# Patient Record
Sex: FEMALE | Race: WHITE | Employment: FULL TIME | URBAN - METROPOLITAN AREA
[De-identification: names, ages, dates, MRNs, and addresses within clinical notes are randomized per-mention and may not be internally consistent; named-entity substitution may affect disease eponyms.]

---

## 2022-06-08 ENCOUNTER — OFFICE VISIT (OUTPATIENT)
Dept: FAMILY MEDICINE CLINIC | Age: 37
End: 2022-06-08
Payer: MEDICARE

## 2022-06-08 VITALS
DIASTOLIC BLOOD PRESSURE: 68 MMHG | OXYGEN SATURATION: 98 % | HEIGHT: 65 IN | WEIGHT: 131.8 LBS | SYSTOLIC BLOOD PRESSURE: 98 MMHG | HEART RATE: 80 BPM | BODY MASS INDEX: 21.96 KG/M2 | TEMPERATURE: 98.1 F

## 2022-06-08 DIAGNOSIS — G43.D1 INTRACTABLE ABDOMINAL MIGRAINE: ICD-10-CM

## 2022-06-08 DIAGNOSIS — Z13.220 ENCOUNTER FOR LIPID SCREENING FOR CARDIOVASCULAR DISEASE: ICD-10-CM

## 2022-06-08 DIAGNOSIS — F12.90 MARIJUANA SMOKER: ICD-10-CM

## 2022-06-08 DIAGNOSIS — Z11.59 ENCOUNTER FOR SCREENING FOR OTHER VIRAL DISEASES: ICD-10-CM

## 2022-06-08 DIAGNOSIS — Z00.01 ENCOUNTER FOR WELL ADULT EXAM WITH ABNORMAL FINDINGS: Primary | ICD-10-CM

## 2022-06-08 DIAGNOSIS — F43.22 ADJUSTMENT DISORDER WITH ANXIETY: ICD-10-CM

## 2022-06-08 DIAGNOSIS — Z13.6 ENCOUNTER FOR LIPID SCREENING FOR CARDIOVASCULAR DISEASE: ICD-10-CM

## 2022-06-08 PROCEDURE — 99385 PREV VISIT NEW AGE 18-39: CPT | Performed by: FAMILY MEDICINE

## 2022-06-08 RX ORDER — SUMATRIPTAN 20 MG/1
1 SPRAY NASAL PRN
Qty: 1 EACH | Refills: 3 | Status: SHIPPED | OUTPATIENT
Start: 2022-06-08 | End: 2022-09-21

## 2022-06-08 RX ORDER — RIZATRIPTAN BENZOATE 10 MG/1
10 TABLET ORAL
Qty: 27 TABLET | Refills: 1 | Status: SHIPPED | OUTPATIENT
Start: 2022-06-08 | End: 2022-06-08 | Stop reason: ALTCHOICE

## 2022-06-08 RX ORDER — PROCHLORPERAZINE MALEATE 5 MG/1
5 TABLET ORAL EVERY 8 HOURS PRN
Qty: 30 TABLET | Refills: 0 | Status: SHIPPED | OUTPATIENT
Start: 2022-06-08 | End: 2022-09-21 | Stop reason: SDUPTHER

## 2022-06-08 SDOH — ECONOMIC STABILITY: FOOD INSECURITY: WITHIN THE PAST 12 MONTHS, YOU WORRIED THAT YOUR FOOD WOULD RUN OUT BEFORE YOU GOT MONEY TO BUY MORE.: NEVER TRUE

## 2022-06-08 SDOH — HEALTH STABILITY: PHYSICAL HEALTH: ON AVERAGE, HOW MANY MINUTES DO YOU ENGAGE IN EXERCISE AT THIS LEVEL?: 40 MIN

## 2022-06-08 SDOH — HEALTH STABILITY: PHYSICAL HEALTH: ON AVERAGE, HOW MANY DAYS PER WEEK DO YOU ENGAGE IN MODERATE TO STRENUOUS EXERCISE (LIKE A BRISK WALK)?: 3 DAYS

## 2022-06-08 SDOH — ECONOMIC STABILITY: FOOD INSECURITY: WITHIN THE PAST 12 MONTHS, THE FOOD YOU BOUGHT JUST DIDN'T LAST AND YOU DIDN'T HAVE MONEY TO GET MORE.: NEVER TRUE

## 2022-06-08 ASSESSMENT — ANXIETY QUESTIONNAIRES
3. WORRYING TOO MUCH ABOUT DIFFERENT THINGS: 0
5. BEING SO RESTLESS THAT IT IS HARD TO SIT STILL: 0
6. BECOMING EASILY ANNOYED OR IRRITABLE: 1
7. FEELING AFRAID AS IF SOMETHING AWFUL MIGHT HAPPEN: 0
IF YOU CHECKED OFF ANY PROBLEMS ON THIS QUESTIONNAIRE, HOW DIFFICULT HAVE THESE PROBLEMS MADE IT FOR YOU TO DO YOUR WORK, TAKE CARE OF THINGS AT HOME, OR GET ALONG WITH OTHER PEOPLE: NOT DIFFICULT AT ALL
GAD7 TOTAL SCORE: 4
4. TROUBLE RELAXING: 1
1. FEELING NERVOUS, ANXIOUS, OR ON EDGE: 1
2. NOT BEING ABLE TO STOP OR CONTROL WORRYING: 1

## 2022-06-08 ASSESSMENT — SOCIAL DETERMINANTS OF HEALTH (SDOH)
WITHIN THE LAST YEAR, HAVE YOU BEEN KICKED, HIT, SLAPPED, OR OTHERWISE PHYSICALLY HURT BY YOUR PARTNER OR EX-PARTNER?: NO
HOW HARD IS IT FOR YOU TO PAY FOR THE VERY BASICS LIKE FOOD, HOUSING, MEDICAL CARE, AND HEATING?: NOT VERY HARD
WITHIN THE LAST YEAR, HAVE TO BEEN RAPED OR FORCED TO HAVE ANY KIND OF SEXUAL ACTIVITY BY YOUR PARTNER OR EX-PARTNER?: NO
WITHIN THE LAST YEAR, HAVE YOU BEEN HUMILIATED OR EMOTIONALLY ABUSED IN OTHER WAYS BY YOUR PARTNER OR EX-PARTNER?: NO
WITHIN THE LAST YEAR, HAVE YOU BEEN AFRAID OF YOUR PARTNER OR EX-PARTNER?: NO

## 2022-06-08 ASSESSMENT — ENCOUNTER SYMPTOMS
NAUSEA: 1
SORE THROAT: 0
EYE REDNESS: 0
ABDOMINAL PAIN: 1
SINUS PRESSURE: 0
COUGH: 0
TROUBLE SWALLOWING: 0
BLOOD IN STOOL: 0
VOMITING: 0
CONSTIPATION: 1
CHEST TIGHTNESS: 0
BACK PAIN: 0
RHINORRHEA: 0
ABDOMINAL DISTENTION: 0
SHORTNESS OF BREATH: 0
STRIDOR: 0
COLOR CHANGE: 0
RECTAL PAIN: 0
WHEEZING: 0
DIARRHEA: 0

## 2022-06-08 ASSESSMENT — PATIENT HEALTH QUESTIONNAIRE - PHQ9
2. FEELING DOWN, DEPRESSED OR HOPELESS: 0
SUM OF ALL RESPONSES TO PHQ9 QUESTIONS 1 & 2: 1
SUM OF ALL RESPONSES TO PHQ QUESTIONS 1-9: 1
1. LITTLE INTEREST OR PLEASURE IN DOING THINGS: 1
SUM OF ALL RESPONSES TO PHQ QUESTIONS 1-9: 1

## 2022-06-08 NOTE — PROGRESS NOTES
Visit Information    Have you changed or started any medications since your last visit including any over-the-counter medicines, vitamins, or herbal medicines? no   Have you stopped taking any of your medications? Is so, why? -  no  Are you having any side effects from any of your medications? - no    Have you seen any other physician or provider since your last visit?  no   Have you had any other diagnostic tests since your last visit?  no   Have you been seen in the emergency room and/or had an admission in a hospital since we last saw you?  no   Have you had your routine dental cleaning in the past 6 months?  yes -     Do you have an active MyChart account? If no, what is the barrier?   Yes    Patient Care Team:  Henrry Argueta MD as PCP - General (Family Medicine)    Medical History Review  Past Medical, Family, and Social History reviewed and does contribute to the patient presenting condition    Health Maintenance   Topic Date Due    Varicella vaccine (1 of 2 - 2-dose childhood series) Never done    COVID-19 Vaccine (1) Never done    Depression Screen  Never done    HIV screen  Never done    Hepatitis C screen  Never done    Cervical cancer screen  Never done    Flu vaccine (Season Ended) 09/01/2022    DTaP/Tdap/Td vaccine (2 - Td or Tdap) 12/12/2027    Hepatitis A vaccine  Aged Out    Hepatitis B vaccine  Aged Out    Hib vaccine  Aged Out    Meningococcal (ACWY) vaccine  Aged Out    Pneumococcal 0-64 years Vaccine  Aged Out

## 2022-06-08 NOTE — PATIENT INSTRUCTIONS
New Updates for [facility] MyChart/ [employer] ([facility]) [facility]    Thank you for choosing [facility] to give you the best care! [employer] ([facility]) is always trying to think of new ways to help their patients. We are asking all patients to try out the new digital registration that is now available through your [facility] account or the new [facility], [employer] ([facility]). Via the nghia you're now able to update your personal and registration information prior to your upcoming appointment. This will save you time once you arrive at the office to check-in, not to mention your information remains safe!! Many other perks come from signing up for an account, such as:   Requesting refills   Scheduling an appointment   Completing an [address] Sending a message to the office/provider   Having access to your medication list   Paying your bill/copay prior to your appointment   Scheduling your yearly mammogram   Review your test results    If you are not familiar with [facility] or the [employer] ([facility]) [facility]IA, please ask one of us and we will be happy to answer any questions or help you set-up your account. Your [facility],  [facility]    Patient Education        Learning About Cannabis Use Disorder  What is cannabis use disorder? Cannabis is a drug that comes from the cannabis plant. Different forms of cannabis include marijuana, hashish, and hash oil. Some people who use cannabis develop cannabis use disorder. Cannabis use disorder means that a person uses cannabis even though it causes harm to themselves or others. The disorder can range from mild to severe. The more signs of this disorder you have, the more severe it may be. When it's severe, it's sometimes called addiction. People whohave it may find it hard to control their use of cannabis. What are the signs? You may have cannabis use disorder if two or more of the following are true. The more signs of this disorder you have, the more severe it may be.    You use larger amounts of cannabis than you ever meant to. Or you've been using it for a longer period of time than you ever meant to.  You can't cut down or control your use. Or you constantly wish you could cut down.  You spend a lot of time getting or using cannabis or recovering from the effects.  You have strong cravings for cannabis.  You can no longer do your main jobs at work, school, or home.  You keep using even though your cannabis use is hurting your relationships.  You have stopped doing important activities because of your cannabis use.  You use cannabis in situations where doing so is dangerous.  You keep using cannabis even though you know it's causing physical or psychological health problems.  You need more and more cannabis to get the same effect, or you get less effect from the same amount over time. This is called tolerance.  You have uncomfortable symptoms when you stop using cannabis or use less. This is called withdrawal.  How is cannabis use disorder treated? Treatment may include group therapy, one or more types of counseling, and drugeducation. Sometimes medicines are used to help manage symptoms. Treatment focuses on more than drug use. It helps you cope with the anger, frustration, sadness, and disappointment that often happen when a person triesto stop using drugs. Treatment also looks at other parts of your life. For example, how are your relationships with friends and family? What's going on at school and work? Do you have health problems? What is your living situation? Treatment helps you find and manage problems. It helps you take control of your life so you don'thave to depend on drugs. A drug problem affects the whole family. Family counseling often is part oftreatment. Follow-up care is a key part of your treatment and safety. Be sure to make and go to all appointments, and call your doctor if you are having problems.  It's also a good idea to know your test results and keep alist of the medicines you take. Where can you learn more? Go to [email or web address] and sign in to your [employer] account. Enter B669 in the [facility] box to learn more about \"Learning About Cannabis Use Disorder. \"     If you do not have an account, please click on the \"Sign Up Now\" link. Current as of: November 8, 2021               Content Version: 13.2  © 2006-2022 Healthwise, Incorporated. Care instructions adapted under license by [facility] ([facility]). If you have questions about a medical condition or this instruction, always ask your healthcare professional. [address] any warranty or liability for your use of this information.

## 2022-06-08 NOTE — PROGRESS NOTES
2022      Boston Marshall (:  1985) is a 43 y.o. female, here for a preventive medicine evaluation, New Patient, Annual Exam, and Migraine (been going on for at least 10 yrs, at least once a month, feels like it happens around her menstruation)   . ASSESSMENT/PLAN:    1. Encounter for well adult exam with abnormal findings  Physical done  -     CBC with Auto Differential; Future  -     Comprehensive Metabolic Panel; Future  2. Intractable abdominal migraine  Start on Imitrex nasal to help with migraine headaches discussed about the prophylactic dose we will hold on that. Look for chlorpromazine as needed. -     Magnesium; Future  -     Vitamin D 25 Hydroxy; Future  -     Urinalysis with Reflex to Culture; Future  -     prochlorperazine (COMPAZINE) 5 MG tablet; Take 1 tablet by mouth every 8 hours as needed for Nausea, Disp-30 tablet, R-0Normal  -     SUMAtriptan (IMITREX) 20 MG/ACT nasal spray; 1 spray by Nasal route as needed for Migraine, Disp-1 each, R-3Normal  3. Adjustment disorder with anxiety  4. Encounter for screening for other viral diseases  Discussed with patient mother laxation techniques, discussed about the marijuana side effects can try and anxiety medications after weaning from the marijuana  -     Hepatitis C Antibody; Future  -     Varicella Zoster Antibody, IgG; Future  -     HIV Screen; Future  5. Encounter for lipid screening for cardiovascular disease  -     Lipid Panel; Future  -     TSH; Future  6. Marijuana smoker  Counseling and education provided handout also provided. Low carb, low fat diet, increase fruits and vegetables, and exercise 4-5 times a week 30-40 minutes a day, or walk 1-2 hours per day, or wear a pedometer and get at least 10,000 steps per day. Dental exam 2-3 times /year advised. Immunizations reviewed. Health Maintenance reviewed   Smoking cessation counseling given, not smoking    Annual eye exam advised.       Ahsan Peralta received counseling on the following healthy behaviors: nutrition, exercise and medication adherence  Reviewed prior labs and health maintenance  Discussed use, benefit, and side effects of prescribed medications. Barriers to medication compliance addressed. Patient given educational materials - see patient instructions  All patient questions answered. Patient voiced understanding. The patient's past medical, surgical, social, and family history as well as her  current medications and allergies were reviewed as documented in today's encounter. Medications, labs, diagnostic studies, consultations and follow-up as documented in thisencounter. Return in about 3 months (around 2022) for lab work. Data Unavailable    Future Appointments   Date Time Provider Mary Anne Milton   2022 10:45 AM [facility]           Patient Active Problem List   Diagnosis    Intractable abdominal migraine    Marijuana smoker    Adjustment disorder with anxiety       Prior to Visit Medications    Medication Sig Taking? Authorizing Provider   prochlorperazine (COMPAZINE) 5 MG tablet Take 1 tablet by mouth every 8 hours as needed for Nausea Yes Jaron Delacruz MD   SUMAtriptan (IMITREX) 20 MG/ACT nasal spray 1 spray by Nasal route as needed for Migraine Yes Jaron Delacruz MD        No Known Allergies    History reviewed. No pertinent past medical history. Past Surgical History:   Procedure Laterality Date    TONSILLECTOMY         .   Social History     Tobacco Use    Smoking status: Former Smoker     Packs/day: 0.50     Years: 8.00     Pack years: 4.00     Types: Cigarettes     Quit date: 2009     Years since quittin.4    Smokeless tobacco: Never Used   Vaping Use    Vaping Use: Never used   Substance Use Topics    Alcohol use: Not Currently    Drug use: Yes     Types: Marijuana Fronmehul Alva)     Comment: daily       Family History   Problem Relation Age of Onset    Arthritis Mother     Alcohol Abuse Father     Colon Cancer Paternal Grandfather        ADVANCE DIRECTIVE: N, <no information>    ANNIE / Bud Valente is here for New Patient, Annual Exam, and Migraine (been going on for at least 10 yrs, at least once a month, feels like it happens around her menstruation)     Patient is scheduled to establish, patient has not seen any physician since last 5 years. Patient reports she has history of migraine headaches which are chronic was on Imitrex in the past.  Patient reports her migraine headaches are getting worse usually comes 1 and a month but states for the whole day associated with nausea blurring of vision. The pain is about 6-7 on scale radiating to whole head. She denies any numbness, dizziness. Patient has history of anxiety, reports she was started on medication but she could not tolerate that. She has severe side effects does not member the name of the medication. Patient reports she does marijuana which helps with her anxiety and calm her down. Patient is willing to cut down never tried in the past.    BEV 7 SCORE 6/8/2022   BEV-7 Total Score 4     Interpretation of BEV-7 score: 5-9 = mild anxiety, 10-14 = moderate anxiety, 15+ = severe anxiety. Recommend referral to behavioral health for scores 10 or greater. Urinary symptoms: no    Sexually active: Yes     last pap: was normal  The patient has no history of abnormal PAP    Last mammogram: Not due  The patient has no family history of breast cancer    Wears seatbelts: yes     Regular exercise: yes  Ever been transfused or tattooed?: not asked  The patient reports that domestic violence in her life is absent. Last eye exam: up to date: Yes        Visual Acuity Screening    Right eye Left eye Both eyes   Without correction: 20/20/ 20/25 20/20   With correction:          Hearing:normal :Yes    Last dental exam and preventative dental cleaning: up to date : Yes    Nutritional assessment: Body mass index is 21.93 kg/m². Normal.     Weight is   Wt Readings from Last 3 Encounters:   06/08/22 131 lb 12.8 oz (59.8 kg)       Healthful diet and Physical activity counseling to prevent CVD- low carb, low fat diet, increase fruits and vegetables, and exercise 4-5 times a day 30-40minutes a day discussed    Nicotine dependence. no  Smoker, counseling given to quit smoking. Counseling given: Not Answered      Alcohol use: no      There is no immunization history on file for this patient. COVID-19 vaccine:  No:      Tdap one time, then Td every 10 years-up to date:  Yes    Influenza annually-up to date:  Yes    PPSV 23 in all adults 19-63 yo with chronic conditions,smokers, alcoholism,  nursing home residents; then PCV 13 at 73 yo-up to date: No:  or N/A    Menopause: No:    Patient's last menstrual period was 06/02/2022 (exact date). Colon cancer screening recommended at 39years old no   The patient has yes, and his grandpa and age of 76s family history of colon cancer    Blood pressure is normal.  BP Readings from Last 3 Encounters:   06/08/22 98/68       Pulse is normal.  Pulse Readings from Last 3 Encounters:   06/08/22 80       A1c is   No results found for: LABA1C    Lipid screening -    No results found for: CHOL  No results found for: TRIG  No results found for: HDL  No results found for: LDLCHOLESTEROL, LDLCALC  No results found for: [facility]    Cardiovascular risk is: The ASCVD Risk score (Yarelis Mitchell, et al., 2013) failed to calculate for the following reasons: The 2013 ASCVD risk score is only valid for ages 36 to 78    Hepatitis C screening-   No results found for: HAV, HEPAIGM, HEPBIGM, HEPBCAB, HBEAG, HEPCAB         []Negative depression screening.    []1-4 = Minimal depression   []5-9 = Mild depression   []10-14 = Moderate depression   []15-19 = Moderately severe depression   []20-27 = Severe depression    PHQ Scores 6/8/2022   PHQ2 Score 1   PHQ9 Score 1       Health Maintenance   Topic Date Due    Varicella vaccine (1 of 2 - 2-dose childhood series) Never done    COVID-19 Vaccine (1) Never done    HIV screen  Never done    Hepatitis C screen  Never done    Cervical cancer screen  Never done    Flu vaccine (Season Ended) 09/01/2022    Depression Screen  06/08/2023    DTaP/Tdap/Td vaccine (2 - Td or Tdap) 12/12/2027    Hepatitis A vaccine  Aged Out    Hepatitis B vaccine  Aged Out    Hib vaccine  Aged Out    Meningococcal (ACWY) vaccine  Aged Out    Pneumococcal 0-64 years Vaccine  Aged Out       -rest of complaints with corresponding details per ROS    Review of Systems   Constitutional: Negative for activity change, appetite change, fatigue, fever and unexpected weight change. HENT: Negative for congestion, ear pain, postnasal drip, rhinorrhea, sinus pressure, sore throat and trouble swallowing. Eyes: Negative for redness and visual disturbance. Respiratory: Negative for cough, chest tightness, shortness of breath, wheezing and stridor. Cardiovascular: Negative for chest pain, palpitations and leg swelling. Gastrointestinal: Positive for abdominal pain, constipation and nausea. Negative for abdominal distention, blood in stool, diarrhea, rectal pain and vomiting. Endocrine: Negative for polyuria. Genitourinary: Negative for difficulty urinating, flank pain, frequency and urgency. Musculoskeletal: Negative for arthralgias, back pain, gait problem, myalgias and neck pain. Skin: Negative for color change, rash and wound. Allergic/Immunologic: Negative for food allergies and immunocompromised state. Neurological: Positive for headaches. Negative for dizziness, speech difficulty, weakness, light-headedness and numbness. Psychiatric/Behavioral: Positive for decreased concentration. Negative for agitation, behavioral problems, dysphoric mood, hallucinations, sleep disturbance and suicidal ideas.  The patient is nervous/anxious and is hyperactive.          -vital signs stable and within normal limits except     BP 98/68   Pulse 80   Temp 98.1 °F (36.7 °C)   Ht 5' 5\" (1.651 m)   Wt 131 lb 12.8 oz (59.8 kg)   LMP 06/02/2022 (Exact Date)   SpO2 98%   Breastfeeding No   BMI 21.93 kg/m²   Vitals:    06/08/22 0908   BP: 98/68   Pulse: 80   Temp: 98.1 °F (36.7 °C)   SpO2: 98%   Weight: 131 lb 12.8 oz (59.8 kg)   Height: 5' 5\" (1.651 m)     Estimated body mass index is 21.93 kg/m² as calculated from the following:    Height as of this encounter: 5' 5\" (1.651 m). Weight as of this encounter: 131 lb 12.8 oz (59.8 kg). Physical Exam  Vitals and nursing note reviewed. Constitutional:       General: She is not in acute distress. Appearance: Normal appearance. She is well-developed. She is not diaphoretic. HENT:      Head: Normocephalic and atraumatic. Right Ear: Tympanic membrane and ear canal normal.      Left Ear: Tympanic membrane and ear canal normal.      Nose: Nose normal.   Eyes:      General:         Right eye: No discharge. Left eye: No discharge. Extraocular Movements: Extraocular movements intact. Conjunctiva/sclera: Conjunctivae normal.      Pupils: Pupils are equal, round, and reactive to light. Neck:      Thyroid: No thyromegaly. Vascular: No carotid bruit. Cardiovascular:      Rate and Rhythm: Normal rate and regular rhythm. Pulses: No decreased pulses. Heart sounds: Normal heart sounds. No murmur heard. Pulmonary:      Effort: Pulmonary effort is normal. No respiratory distress. Breath sounds: Normal breath sounds. No wheezing or rhonchi. Abdominal:      General: Bowel sounds are normal. There is no distension. Palpations: Abdomen is soft. There is no mass. Tenderness: There is no abdominal tenderness. There is no right CVA tenderness, left CVA tenderness, guarding or rebound. Musculoskeletal:         General: No tenderness. Cervical back: Normal range of motion and neck supple.  No rigidity, spasms or tenderness. Normal range of motion. Thoracic back: No spasms. Normal range of motion. Lumbar back: No spasms. Normal range of motion. Lymphadenopathy:      Cervical: No cervical adenopathy. Right cervical: No superficial cervical adenopathy. Left cervical: No superficial cervical adenopathy. Skin:     Coloration: Skin is not jaundiced or pale. Findings: No bruising, erythema or rash. Neurological:      General: No focal deficit present. Mental Status: She is alert and oriented to person, place, and time. Cranial Nerves: No cranial nerve deficit. Sensory: No sensory deficit. Motor: No weakness or tremor. Coordination: Coordination normal.      Gait: Gait and tandem walk normal.      Deep Tendon Reflexes: Reflexes are normal and symmetric. Psychiatric:         Attention and Perception: Attention and perception normal. She is attentive. Mood and Affect: Mood is anxious. Mood is not depressed. Affect is not tearful. Speech: She is communicative. Speech is rapid and pressured. Speech is not delayed or slurred. Behavior: Behavior normal. Behavior is not agitated or slowed. Thought Content: Thought content normal.         Judgment: Judgment normal.         No flowsheet data found. I personally reviewed testing with patient.       Otherwise labs within normal limits      No results found for: WBC, HGB, HCT, MCV, PLT    No results found for: NA, K, CL, CO2, BUN, CREATININE, GLUCOSE, CALCIUM     No results found for: ALT, AST, GGT, ALKPHOS, BILITOT    No results found for: TSHFT4, TSH    No results found for: LDLCALC, LDLCHOLESTEROL, LDLDIRECT    No results found for: LABA1C    No results found for: [id number]    No results found for: FOLATE    No results found for: IRON, TIBC, FERRITIN    No results found for: VITD25      Orders Placed This Encounter   Medications    DISCONTD: rizatriptan (MAXALT) 10 MG tablet     Sig: Take 1 tablet by mouth once as needed for Migraine May repeat in 2 hours if needed     Dispense:  27 tablet     Refill:  1    prochlorperazine (COMPAZINE) 5 MG tablet     Sig: Take 1 tablet by mouth every 8 hours as needed for Nausea     Dispense:  30 tablet     Refill:  0    SUMAtriptan (IMITREX) 20 MG/ACT nasal spray     Si spray by Nasal route as needed for Migraine     Dispense:  1 each     Refill:  3         Medications Discontinued During This Encounter   Medication Reason    rizatriptan (MAXALT) 10 MG tablet Alternate therapy         Orders Placed This Encounter   Procedures    CBC with Auto Differential     Standing Status:   Future     Standing Expiration Date:   2023    Comprehensive Metabolic Panel     Fasting 8 hrs     Standing Status:   Future     Standing Expiration Date:   2023    Lipid Panel     Standing Status:   Future     Standing Expiration Date:   2023     Order Specific Question:   Is Patient Fasting?/# of Hours     Answer:   yes, 8-10 hours    Hepatitis C Antibody     Standing Status:   Future     Standing Expiration Date:   2023    Magnesium     Standing Status:   Future     Standing Expiration Date:   2023    Vitamin D 25 Hydroxy     Standing Status:   Future     Standing Expiration Date:   2023    Urinalysis with Reflex to Culture     Standing Status:   Future     Standing Expiration Date:   2023     Order Specific Question:   SPECIFY(EX-CATH,MIDSTREAM,CYSTO,ETC)? Answer:   midstream    TSH     Standing Status:   Future     Standing Expiration Date:   2023    Varicella Zoster Antibody, IgG     Standing Status:   Future     Standing Expiration Date:   2023    HIV Screen     Standing Status:   Future     Standing Expiration Date:   2023         This note was completed by using the assistance of a speech-recognition program. However, inadvertent computerized transcription errors may be present.  Although every effort was made to ensure accuracy, no guarantees can be provided that every mistake has been identified and corrected by editing. An electronic signature was used to authenticate this note.     Electronically signed by Jaron Delacruz MD on 6/8/2022 at 10:19 AM

## 2022-09-20 DIAGNOSIS — R30.0 DYSURIA: Primary | ICD-10-CM

## 2022-09-20 PROBLEM — A60.00 HERPES GENITALIS: Status: ACTIVE | Noted: 2017-05-16

## 2022-09-20 RX ORDER — BUTALBITAL, ACETAMINOPHEN AND CAFFEINE 50; 325; 40 MG/1; MG/1; MG/1
1 TABLET ORAL EVERY 4 HOURS PRN
COMMUNITY
Start: 2022-09-16

## 2022-09-20 ASSESSMENT — PATIENT HEALTH QUESTIONNAIRE - PHQ9
SUM OF ALL RESPONSES TO PHQ QUESTIONS 1-9: 0
1. LITTLE INTEREST OR PLEASURE IN DOING THINGS: 0
SUM OF ALL RESPONSES TO PHQ QUESTIONS 1-9: 0
SUM OF ALL RESPONSES TO PHQ QUESTIONS 1-9: 0
2. FEELING DOWN, DEPRESSED OR HOPELESS: 0
SUM OF ALL RESPONSES TO PHQ QUESTIONS 1-9: 0
SUM OF ALL RESPONSES TO PHQ9 QUESTIONS 1 & 2: 0

## 2022-09-20 NOTE — PROGRESS NOTES
[facility] at [address] [facility] [address] [facility] [place] [address]   O: [id number] Josefina Cox is a 43 y.o. female evaluated via telephone on 9/21/2022. CONSENT:  She and/or health care decision maker is aware that that she may receive a bill for this telephone service, depending on her insurance coverage, and has provided verbal consent to proceed: Yes    DOCUMENTATION:  Patient scheduled this appointment today due to ED Follow-up ([address] [place] [address]) and Results (HAD CT SCAN)    MIGRAINE HEADACHE  Adelaide Hearn is a 43 y.o. female patient reports a history of migraine headaches. Usually around menstrual cycle. Has them 1- 2 a month. No preventative treatment  Did not get the compazine. From pharmacy  Seen in the ER. Due to vomiting. And severe and prolong symptoms  She was referred to the neurologist due to the CT. Did not receive  Asking for referral from our office- wants to see Dr Rosaline Casas:  No evidence of acute intracranial hemorrhage, territorial infarct, mass effect, midline shift, or extra-axial fluid collection. Brain volume is age appropriate. Ventricles, sulci and cisterns are unremarkable. 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 Orbits and globes appear   unremarkable. Soft tissues are unremarkable. Trace mucosal thickening of the ethmoid sinuses and right maxillary sinus. Mastoid air cells are broadly clear. No evidence of aggressive osseous lesion. IMPRESSION:  * No acute intracranial abnormality, by CT. 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 DEPRESSION SCREENING: Negative  PHQ Scores 9/20/2022 6/8/2022   PHQ2 Score 0 1   PHQ9 Score 0 1     I communicated with the patient and/or health care decision maker about: PLAN     Review of Systems   Constitutional:  Negative for chills and fever. HENT: Negative. Respiratory: Negative.   Negative for shortness of breath and wheezing. Cardiovascular: Negative. Negative for chest pain and palpitations. Gastrointestinal:  Positive for nausea. Negative for abdominal pain. Endocrine: Negative. Musculoskeletal:  Negative for arthralgias. Neurological:  Positive for headaches. Negative for weakness and numbness. Psychiatric/Behavioral:  Negative for sleep disturbance and suicidal ideas. The patient is nervous/anxious. Details of this discussion including any medical advice provided: Under assessment. PHYSICAL EXAM[INSTRUCTIONS:  \"[x]\" Indicates a positive item  \"[]\" Indicates a negative item  -- DELETE ALL ITEMS NOT EXAMINED]  Patient-Reported Vitals 9/21/2022   Patient-Reported Weight 125   Patient-Reported Height 56     Constitutional: [x] No apparent distress      [] Abnormal -   Mental status: [x] Alert and awake  [x] Oriented to person/place/time[] Abnormal -   Pulmonary/Chest: [x] Respiratory effort normal         [] Abnormal -          Psychiatric:       [x] Normal Affect [] Abnormal -        [x] No Hallucinations  Other pertinent observable physical exam findings:-Mildly anxious  Controlled Substance Monitoring:  Acute and Chronic Pain Monitoring:   No flowsheet data found. ASSESSMENT  1. Intractable chronic migraine without aura and without status migrainosus  Failure to Improve  Take medications as discussed. DISCUSSED AND ADVISED TO:  Find ways to manage stress. Rest well, cut down on caffeine, and alcohol intake  Report for increasing incidences and worsening symptoms.    - butalbital-acetaminophen-caffeine (FIORICET, ESGIC) -40 MG per tablet; Take 1 tablet by mouth every 4 hours as needed  - prochlorperazine (COMPAZINE) 5 MG tablet; Take 1 tablet by mouth every 8 hours as needed for Nausea  Dispense: 30 tablet; Refill: 0  - SUMAtriptan (IMITREX) 50 MG tablet; Take 1 tablet by mouth once as needed for Migraine  Dispense: 9 tablet;  Refill: 2  - Amb External Referral To Neurology    2. Abnormal head CT  Failure to Improve  Consult neurology  Continue to monitor  Follow up in 3 months  Wants to see specific neurologist    - Amb External Referral To Neurology    Total Time: minutes: 11-20 minutes  Patricia Tapia was evaluated through a synchronous (real-time) audio encounter. Patient identification was verified at the start of the visit. She (or guardian if applicable) is aware that this is a billable service, which includes applicable co-pays. This visit was conducted with the patient's (and/or legal guardian's) verbal consent. She has not had a related appointment within my department in the past 7 days or scheduled within the next 24 hours. The patient was located at Home: [address]. The provider was located at [facility] (Appt Dept): [place] [address]. Note: not billable if this call serves to triage the patient into an appointment for the relevant concern  This note was completed by using the assistance of a speech-recognition program. However, inadvertent computerized transcription errors may be present. Although every effort was made to ensure accuracy, no guarantees can be provided that every mistake has been identified and corrected by editing.   Electronically signed by NITHYA Valentin CNP on 9/20/22 at 8:24 AM EDT

## 2022-09-20 NOTE — PROGRESS NOTES
Visit Information    Have you changed or started any medications since your last visit including any over-the-counter medicines, vitamins, or herbal medicines? no   Have you stopped taking any of your medications? Is so, why? -  no  Are you having any side effects from any of your medications? - no    Have you seen any other physician or provider since your last visit? yes -    Have you had any other diagnostic tests since your last visit? yes -    Have you been seen in the emergency room and/or had an admission in a hospital since we last saw you?  yes -    Have you had your routine dental cleaning in the past 6 months?  no     Do you have an active MyChart account? If no, what is the barrier?   Yes    Patient Care Team:  Lu Romo MD as PCP - General (Family Medicine)  Lu Romo MD as PCP - [facility] Provider    Medical History Review  Past Medical, Family, and Social History reviewed and does contribute to the patient presenting condition    Health Maintenance   Topic Date Due    COVID-19 Vaccine (1) Never done    Varicella vaccine (1 of 2 - 2-dose childhood series) Never done    HIV screen  Never done    Hepatitis C screen  Never done    Cervical cancer screen  Never done    Flu vaccine (1) 09/01/2022    Depression Screen  06/08/2023    DTaP/Tdap/Td vaccine (2 - Td or Tdap) 12/12/2027    Hepatitis A vaccine  Aged Out    Hepatitis B vaccine  Aged Out    Hib vaccine  Aged Out    Meningococcal (ACWY) vaccine  Aged Out    Pneumococcal 0-64 years Vaccine  Aged Out

## 2022-09-21 ENCOUNTER — TELEMEDICINE (OUTPATIENT)
Dept: FAMILY MEDICINE CLINIC | Age: 37
End: 2022-09-21
Payer: MEDICARE

## 2022-09-21 DIAGNOSIS — G43.719 INTRACTABLE CHRONIC MIGRAINE WITHOUT AURA AND WITHOUT STATUS MIGRAINOSUS: Primary | ICD-10-CM

## 2022-09-21 DIAGNOSIS — R93.0 ABNORMAL HEAD CT: ICD-10-CM

## 2022-09-21 PROCEDURE — 99213 OFFICE O/P EST LOW 20 MIN: CPT | Performed by: FAMILY MEDICINE

## 2022-09-21 PROCEDURE — G8427 DOCREV CUR MEDS BY ELIG CLIN: HCPCS | Performed by: FAMILY MEDICINE

## 2022-09-21 RX ORDER — PROCHLORPERAZINE MALEATE 5 MG/1
5 TABLET ORAL EVERY 8 HOURS PRN
Qty: 30 TABLET | Refills: 0 | Status: SHIPPED | OUTPATIENT
Start: 2022-09-21

## 2022-09-21 RX ORDER — SUMATRIPTAN 50 MG/1
50 TABLET, FILM COATED ORAL
Qty: 9 TABLET | Refills: 2 | Status: SHIPPED | OUTPATIENT
Start: 2022-09-21 | End: 2022-09-21

## 2022-09-21 ASSESSMENT — ENCOUNTER SYMPTOMS
WHEEZING: 0
NAUSEA: 1
ABDOMINAL PAIN: 0
RESPIRATORY NEGATIVE: 1
SHORTNESS OF BREATH: 0

## 2022-09-21 NOTE — PATIENT INSTRUCTIONS
New Updates for [id number]/ [employer] ([facility]) [facility]    Thank you for choosing US to give you the best care! [facility] ([facility]) is always trying to think of new ways to help their patients. We are asking all patients to try out the new digital registration that is now available through your [facility] account or the new [facility], [employer] ([facility]). Via the nghia you're now able to update your personal and registration information prior to your upcoming appointment. This will save you time once you arrive at the office to check-in, not to mention your information remains safe!! Many other perks come from signing up for an account, such as:  Requesting refills  Scheduling an appointment  Completing an E-Visit  Sending a message to the office/provider  Having access to your medication list  Paying your bill/copay prior to your appointment  Scheduling your yearly mammogram  Review your test results    If you are not familiar with [facility] or the [employer] [facility]IA, please ask one of us and we will be happy to answer any questions or help you set-up your account.       Your [id number] [address] office,  [place]

## 2022-09-22 DIAGNOSIS — R30.0 DYSURIA: ICD-10-CM
